# Patient Record
Sex: MALE | Race: WHITE
[De-identification: names, ages, dates, MRNs, and addresses within clinical notes are randomized per-mention and may not be internally consistent; named-entity substitution may affect disease eponyms.]

---

## 2020-07-12 ENCOUNTER — HOSPITAL ENCOUNTER (INPATIENT)
Dept: HOSPITAL 15 - ER | Age: 85
LOS: 4 days | Discharge: HOME | DRG: 291 | End: 2020-07-16
Attending: INTERNAL MEDICINE | Admitting: INTERNAL MEDICINE
Payer: COMMERCIAL

## 2020-07-12 VITALS — WEIGHT: 200.62 LBS | HEIGHT: 65 IN | BODY MASS INDEX: 33.43 KG/M2

## 2020-07-12 DIAGNOSIS — I50.33: ICD-10-CM

## 2020-07-12 DIAGNOSIS — R80.9: ICD-10-CM

## 2020-07-12 DIAGNOSIS — E11.65: ICD-10-CM

## 2020-07-12 DIAGNOSIS — E11.22: ICD-10-CM

## 2020-07-12 DIAGNOSIS — I44.0: ICD-10-CM

## 2020-07-12 DIAGNOSIS — N18.3: ICD-10-CM

## 2020-07-12 DIAGNOSIS — I25.10: ICD-10-CM

## 2020-07-12 DIAGNOSIS — N40.0: ICD-10-CM

## 2020-07-12 DIAGNOSIS — E66.9: ICD-10-CM

## 2020-07-12 DIAGNOSIS — Z88.0: ICD-10-CM

## 2020-07-12 DIAGNOSIS — Z79.84: ICD-10-CM

## 2020-07-12 DIAGNOSIS — Z90.49: ICD-10-CM

## 2020-07-12 DIAGNOSIS — G20: ICD-10-CM

## 2020-07-12 DIAGNOSIS — R00.1: ICD-10-CM

## 2020-07-12 DIAGNOSIS — I13.0: Primary | ICD-10-CM

## 2020-07-12 DIAGNOSIS — E87.1: ICD-10-CM

## 2020-07-12 DIAGNOSIS — E78.5: ICD-10-CM

## 2020-07-12 DIAGNOSIS — Z95.1: ICD-10-CM

## 2020-07-12 DIAGNOSIS — B02.9: ICD-10-CM

## 2020-07-12 DIAGNOSIS — C61: ICD-10-CM

## 2020-07-12 PROCEDURE — 80053 COMPREHEN METABOLIC PANEL: CPT

## 2020-07-12 PROCEDURE — 97530 THERAPEUTIC ACTIVITIES: CPT

## 2020-07-12 PROCEDURE — 71045 X-RAY EXAM CHEST 1 VIEW: CPT

## 2020-07-12 PROCEDURE — 93306 TTE W/DOPPLER COMPLETE: CPT

## 2020-07-12 PROCEDURE — 87040 BLOOD CULTURE FOR BACTERIA: CPT

## 2020-07-12 PROCEDURE — 36415 COLL VENOUS BLD VENIPUNCTURE: CPT

## 2020-07-12 PROCEDURE — 80061 LIPID PANEL: CPT

## 2020-07-12 PROCEDURE — 78452 HT MUSCLE IMAGE SPECT MULT: CPT

## 2020-07-12 PROCEDURE — 85610 PROTHROMBIN TIME: CPT

## 2020-07-12 PROCEDURE — 83036 HEMOGLOBIN GLYCOSYLATED A1C: CPT

## 2020-07-12 PROCEDURE — 85730 THROMBOPLASTIN TIME PARTIAL: CPT

## 2020-07-12 PROCEDURE — 84484 ASSAY OF TROPONIN QUANT: CPT

## 2020-07-12 PROCEDURE — 83880 ASSAY OF NATRIURETIC PEPTIDE: CPT

## 2020-07-12 PROCEDURE — 85025 COMPLETE CBC W/AUTO DIFF WBC: CPT

## 2020-07-12 PROCEDURE — 93005 ELECTROCARDIOGRAM TRACING: CPT

## 2020-07-12 PROCEDURE — 93017 CV STRESS TEST TRACING ONLY: CPT

## 2020-07-12 PROCEDURE — 97163 PT EVAL HIGH COMPLEX 45 MIN: CPT

## 2020-07-12 PROCEDURE — 80048 BASIC METABOLIC PNL TOTAL CA: CPT

## 2020-07-12 PROCEDURE — 81001 URINALYSIS AUTO W/SCOPE: CPT

## 2020-07-12 PROCEDURE — 83735 ASSAY OF MAGNESIUM: CPT

## 2020-07-12 PROCEDURE — 84100 ASSAY OF PHOSPHORUS: CPT

## 2020-07-12 PROCEDURE — 80307 DRUG TEST PRSMV CHEM ANLYZR: CPT

## 2020-07-12 PROCEDURE — 70450 CT HEAD/BRAIN W/O DYE: CPT

## 2020-07-12 PROCEDURE — 87086 URINE CULTURE/COLONY COUNT: CPT

## 2020-07-13 VITALS — DIASTOLIC BLOOD PRESSURE: 92 MMHG | SYSTOLIC BLOOD PRESSURE: 160 MMHG

## 2020-07-13 VITALS — SYSTOLIC BLOOD PRESSURE: 157 MMHG | DIASTOLIC BLOOD PRESSURE: 78 MMHG

## 2020-07-13 LAB
ALBUMIN SERPL-MCNC: 3.6 G/DL (ref 3.4–5)
ALCOHOL, URINE: 7 MG/DL (ref 0–10)
ALP SERPL-CCNC: 70 U/L (ref 45–117)
ALT SERPL-CCNC: 10 U/L (ref 16–61)
AMPHETAMINES UR QL SCN: NEGATIVE
ANION GAP SERPL CALCULATED.3IONS-SCNC: 4 MMOL/L (ref 5–15)
BARBITURATES UR QL SCN: NEGATIVE
BENZODIAZ UR QL SCN: NEGATIVE
BILIRUB SERPL-MCNC: 1.3 MG/DL (ref 0.2–1)
BUN SERPL-MCNC: 30 MG/DL (ref 7–18)
BUN/CREAT SERPL: 22.6
BZE UR QL SCN: NEGATIVE
CALCIUM SERPL-MCNC: 8.8 MG/DL (ref 8.5–10.1)
CANNABINOIDS UR QL SCN: NEGATIVE
CHLORIDE SERPL-SCNC: 99 MMOL/L (ref 98–107)
CHOLEST SERPL-MCNC: 152 MG/DL (ref ?–200)
CO2 SERPL-SCNC: 27 MMOL/L (ref 21–32)
GLUCOSE SERPL-MCNC: 136 MG/DL (ref 74–106)
HCT VFR BLD AUTO: 49.3 % (ref 41–53)
HDLC SERPL-MCNC: 35 MG/DL (ref 40–59)
HGB BLD-MCNC: 16.6 G/DL (ref 13.5–17.5)
MCH RBC QN AUTO: 31.3 PG (ref 28–32)
MCV RBC AUTO: 92.8 FL (ref 80–100)
NRBC BLD QL AUTO: 0 %
OPIATES UR QL SCN: NEGATIVE
PCP UR QL SCN: NEGATIVE
POTASSIUM SERPL-SCNC: 3.8 MMOL/L (ref 3.5–5.1)
PROT SERPL-MCNC: 7.2 G/DL (ref 6.4–8.2)
SODIUM SERPL-SCNC: 130 MMOL/L (ref 136–145)
TRIGL SERPL-MCNC: 143 MG/DL (ref ?–150)

## 2020-07-13 RX ADMIN — CARVEDILOL SCH MG: 3.12 TABLET, FILM COATED ORAL at 22:00

## 2020-07-13 RX ADMIN — TERAZOSIN HYDROCHLORIDE SCH MG: 1 CAPSULE ORAL at 21:57

## 2020-07-13 RX ADMIN — ATORVASTATIN CALCIUM SCH MG: 20 TABLET, FILM COATED ORAL at 21:56

## 2020-07-13 RX ADMIN — SODIUM CHLORIDE SCH ML: 9 INJECTION INTRAMUSCULAR; INTRAVENOUS; SUBCUTANEOUS at 21:56

## 2020-07-13 RX ADMIN — Medication SCH TAB: at 21:56

## 2020-07-13 RX ADMIN — Medication SCH TAB: at 18:44

## 2020-07-13 NOTE — NUR
Blood Pressure Medication Held

Patient's blood pressure 150/66, HR 66. After administration of terazosin 2mg PO per MD 
order, blood pressure reassessed to be 109/62, HR 64. Held Coreg 6.25mg PO to prevent 
further decrease in blood pressure and heart rate. Per patient, he denies taking blood 
pressure or heart rate medications at home. Will continue to monitor.

## 2020-07-13 NOTE — NUR
Telemetry admit from ER:

TONY GOMES admitted to Telemetry unit after SBAR received. Patient oriented to KERMIT PRASAD, RN primary RN, unit, room, bed, and unit policies regarding patient care and visiting 
hours. Patient now on continuous telemetry monitoring, tele box # [id number]. Patient weighed by 
bedscale and encouraged to call if they need something. All questions and concerns 
addressed, patient verbalized understanding.

## 2020-07-13 NOTE — NUR
CLOSING NOTE:

Patient resting in bed. No S/S of distress at this time.

Bed alarm activated for patient safety.

Care endorsed to NOC RN.

## 2020-07-14 VITALS — DIASTOLIC BLOOD PRESSURE: 77 MMHG | SYSTOLIC BLOOD PRESSURE: 139 MMHG

## 2020-07-14 VITALS — SYSTOLIC BLOOD PRESSURE: 128 MMHG | DIASTOLIC BLOOD PRESSURE: 73 MMHG

## 2020-07-14 VITALS — DIASTOLIC BLOOD PRESSURE: 77 MMHG | SYSTOLIC BLOOD PRESSURE: 149 MMHG

## 2020-07-14 VITALS — SYSTOLIC BLOOD PRESSURE: 146 MMHG | DIASTOLIC BLOOD PRESSURE: 80 MMHG

## 2020-07-14 VITALS — DIASTOLIC BLOOD PRESSURE: 85 MMHG | SYSTOLIC BLOOD PRESSURE: 121 MMHG

## 2020-07-14 LAB
ALBUMIN SERPL-MCNC: 3.3 G/DL (ref 3.4–5)
ALP SERPL-CCNC: 63 U/L (ref 45–117)
ALT SERPL-CCNC: 9 U/L (ref 16–61)
ANION GAP SERPL CALCULATED.3IONS-SCNC: 4 MMOL/L (ref 5–15)
APTT PPP: 30 SEC (ref 23.64–32.05)
BILIRUB SERPL-MCNC: 1.4 MG/DL (ref 0.2–1)
BUN SERPL-MCNC: 31 MG/DL (ref 7–18)
BUN/CREAT SERPL: 22
CALCIUM SERPL-MCNC: 8.5 MG/DL (ref 8.5–10.1)
CHLORIDE SERPL-SCNC: 100 MMOL/L (ref 98–107)
CO2 SERPL-SCNC: 28 MMOL/L (ref 21–32)
GLUCOSE SERPL-MCNC: 149 MG/DL (ref 74–106)
HCT VFR BLD AUTO: 46.9 % (ref 41–53)
HGB BLD-MCNC: 15.8 G/DL (ref 13.5–17.5)
INR PPP: 1.09 (ref 0.9–1.15)
MAGNESIUM SERPL-MCNC: 2.5 MG/DL (ref 1.6–2.6)
MCH RBC QN AUTO: 31.2 PG (ref 28–32)
MCV RBC AUTO: 92.5 FL (ref 80–100)
NRBC BLD QL AUTO: 0 %
POTASSIUM SERPL-SCNC: 3.6 MMOL/L (ref 3.5–5.1)
PROT SERPL-MCNC: 6.6 G/DL (ref 6.4–8.2)
SODIUM SERPL-SCNC: 132 MMOL/L (ref 136–145)

## 2020-07-14 RX ADMIN — CARVEDILOL SCH MG: 3.12 TABLET, FILM COATED ORAL at 21:34

## 2020-07-14 RX ADMIN — ENOXAPARIN SODIUM SCH MG: 40 INJECTION SUBCUTANEOUS at 10:37

## 2020-07-14 RX ADMIN — Medication SCH TAB: at 06:10

## 2020-07-14 RX ADMIN — CLOPIDOGREL BISULFATE SCH MG: 75 TABLET ORAL at 10:39

## 2020-07-14 RX ADMIN — SODIUM CHLORIDE SCH ML: 9 INJECTION INTRAMUSCULAR; INTRAVENOUS; SUBCUTANEOUS at 21:58

## 2020-07-14 RX ADMIN — CARVEDILOL SCH MG: 3.12 TABLET, FILM COATED ORAL at 10:38

## 2020-07-14 RX ADMIN — SODIUM CHLORIDE SCH ML: 9 INJECTION INTRAMUSCULAR; INTRAVENOUS; SUBCUTANEOUS at 06:10

## 2020-07-14 RX ADMIN — TERAZOSIN HYDROCHLORIDE SCH MG: 1 CAPSULE ORAL at 21:58

## 2020-07-14 RX ADMIN — SODIUM CHLORIDE SCH ML: 9 INJECTION INTRAMUSCULAR; INTRAVENOUS; SUBCUTANEOUS at 14:00

## 2020-07-14 RX ADMIN — VITAMIN D, TAB 1000IU (100/BT) SCH UNIT: 25 TAB at 10:37

## 2020-07-14 RX ADMIN — ATORVASTATIN CALCIUM SCH MG: 20 TABLET, FILM COATED ORAL at 21:33

## 2020-07-14 RX ADMIN — Medication SCH TAB: at 12:00

## 2020-07-14 RX ADMIN — FINASTERIDE SCH MG: 5 TABLET, FILM COATED ORAL at 10:39

## 2020-07-14 RX ADMIN — Medication SCH TAB: at 18:14

## 2020-07-14 RX ADMIN — Medication SCH TAB: at 21:33

## 2020-07-14 NOTE — NUR
Opening Shift Note

Assumed care of patient, awake and alert.  No S/S of distress/SOB or pain.  Instructed on 
POC and to call for assist PRN, will continue to monitor for changes Q1hr and PRN.Advised no 
food or drink after midnight for stress test tomorrow.

## 2020-07-14 NOTE — NUR
IV removal/insertion

Patient accidently removed 22g IV to the Right forearm. Catheter tip fully intact. Pressure 
dressing applied to site.

NOTE:Inserted 22g IV to the Right hand. Patient tolerated well.

## 2020-07-15 VITALS — SYSTOLIC BLOOD PRESSURE: 119 MMHG | DIASTOLIC BLOOD PRESSURE: 70 MMHG

## 2020-07-15 VITALS — DIASTOLIC BLOOD PRESSURE: 78 MMHG | SYSTOLIC BLOOD PRESSURE: 147 MMHG

## 2020-07-15 VITALS — DIASTOLIC BLOOD PRESSURE: 69 MMHG | SYSTOLIC BLOOD PRESSURE: 117 MMHG

## 2020-07-15 VITALS — DIASTOLIC BLOOD PRESSURE: 65 MMHG | SYSTOLIC BLOOD PRESSURE: 130 MMHG

## 2020-07-15 VITALS — DIASTOLIC BLOOD PRESSURE: 83 MMHG | SYSTOLIC BLOOD PRESSURE: 137 MMHG

## 2020-07-15 LAB
ANION GAP SERPL CALCULATED.3IONS-SCNC: 5 MMOL/L (ref 5–15)
BUN SERPL-MCNC: 26 MG/DL (ref 7–18)
BUN/CREAT SERPL: 21
CALCIUM SERPL-MCNC: 8.7 MG/DL (ref 8.5–10.1)
CHLORIDE SERPL-SCNC: 99 MMOL/L (ref 98–107)
CO2 SERPL-SCNC: 26 MMOL/L (ref 21–32)
GLUCOSE SERPL-MCNC: 139 MG/DL (ref 74–106)
POTASSIUM SERPL-SCNC: 3.8 MMOL/L (ref 3.5–5.1)
SODIUM SERPL-SCNC: 130 MMOL/L (ref 136–145)

## 2020-07-15 RX ADMIN — FINASTERIDE SCH MG: 5 TABLET, FILM COATED ORAL at 10:07

## 2020-07-15 RX ADMIN — Medication SCH TAB: at 05:08

## 2020-07-15 RX ADMIN — Medication SCH TAB: at 11:37

## 2020-07-15 RX ADMIN — SODIUM CHLORIDE SCH ML: 9 INJECTION INTRAMUSCULAR; INTRAVENOUS; SUBCUTANEOUS at 22:24

## 2020-07-15 RX ADMIN — Medication SCH TAB: at 22:19

## 2020-07-15 RX ADMIN — SODIUM CHLORIDE SCH ML: 9 INJECTION INTRAMUSCULAR; INTRAVENOUS; SUBCUTANEOUS at 18:24

## 2020-07-15 RX ADMIN — Medication SCH TAB: at 18:24

## 2020-07-15 RX ADMIN — FUROSEMIDE SCH MG: 10 INJECTION, SOLUTION INTRAMUSCULAR; INTRAVENOUS at 10:05

## 2020-07-15 RX ADMIN — SODIUM CHLORIDE SCH ML: 9 INJECTION INTRAMUSCULAR; INTRAVENOUS; SUBCUTANEOUS at 05:08

## 2020-07-15 RX ADMIN — TERAZOSIN HYDROCHLORIDE SCH MG: 1 CAPSULE ORAL at 22:20

## 2020-07-15 RX ADMIN — CARVEDILOL SCH MG: 3.12 TABLET, FILM COATED ORAL at 22:20

## 2020-07-15 RX ADMIN — VITAMIN D, TAB 1000IU (100/BT) SCH UNIT: 25 TAB at 10:06

## 2020-07-15 RX ADMIN — ENOXAPARIN SODIUM SCH MG: 40 INJECTION SUBCUTANEOUS at 10:05

## 2020-07-15 RX ADMIN — CLOPIDOGREL BISULFATE SCH MG: 75 TABLET ORAL at 10:06

## 2020-07-15 RX ADMIN — CARVEDILOL SCH MG: 3.12 TABLET, FILM COATED ORAL at 10:06

## 2020-07-15 RX ADMIN — ATORVASTATIN CALCIUM SCH MG: 20 TABLET, FILM COATED ORAL at 22:19

## 2020-07-15 NOTE — NUR
IV insertion

IV access obtained, via clean sterile technique by inserting 22 gauge catheter at left hand 
after  attempt. IV secured properly. No trauma to site. Patient tolerated well.

## 2020-07-15 NOTE — NUR
Opening Shift Note

Assumed care of patient, awake, alert,and oriented.  No S/S of distress/SOB or pain. Bed in 
lowest/locked position, bed rails up x2, call light within reach. Instructed on POC and to 
call for assist PRN. Will continue to monitor for changes Q1hr and PRN.

## 2020-07-15 NOTE — NUR
Opening Shift Note

Assumed care of patient, awake and alert.  No S/S of distress/SOB or pain.  Instructed on 
POC and to call for assist PRN, patient verbalized understanding, call light within reach, 
will continue to monitor for changes Q1hr and PRN.

## 2020-07-15 NOTE — NUR
Care report given to Minor JAMISON, patient is NPO for stress test today, patient is 
resting no distress.

## 2020-07-16 VITALS — DIASTOLIC BLOOD PRESSURE: 64 MMHG | SYSTOLIC BLOOD PRESSURE: 123 MMHG

## 2020-07-16 VITALS — SYSTOLIC BLOOD PRESSURE: 118 MMHG | DIASTOLIC BLOOD PRESSURE: 64 MMHG

## 2020-07-16 VITALS — SYSTOLIC BLOOD PRESSURE: 114 MMHG | DIASTOLIC BLOOD PRESSURE: 64 MMHG

## 2020-07-16 VITALS — DIASTOLIC BLOOD PRESSURE: 64 MMHG | SYSTOLIC BLOOD PRESSURE: 118 MMHG

## 2020-07-16 VITALS — DIASTOLIC BLOOD PRESSURE: 79 MMHG | SYSTOLIC BLOOD PRESSURE: 145 MMHG

## 2020-07-16 RX ADMIN — CLOPIDOGREL BISULFATE SCH MG: 75 TABLET ORAL at 09:59

## 2020-07-16 RX ADMIN — FINASTERIDE SCH MG: 5 TABLET, FILM COATED ORAL at 09:48

## 2020-07-16 RX ADMIN — SODIUM CHLORIDE SCH ML: 9 INJECTION INTRAMUSCULAR; INTRAVENOUS; SUBCUTANEOUS at 14:00

## 2020-07-16 RX ADMIN — Medication SCH TAB: at 12:07

## 2020-07-16 RX ADMIN — SODIUM CHLORIDE SCH ML: 9 INJECTION INTRAMUSCULAR; INTRAVENOUS; SUBCUTANEOUS at 05:50

## 2020-07-16 RX ADMIN — ENOXAPARIN SODIUM SCH MG: 40 INJECTION SUBCUTANEOUS at 09:59

## 2020-07-16 RX ADMIN — VITAMIN D, TAB 1000IU (100/BT) SCH UNIT: 25 TAB at 09:49

## 2020-07-16 RX ADMIN — CARVEDILOL SCH MG: 3.12 TABLET, FILM COATED ORAL at 09:58

## 2020-07-16 RX ADMIN — Medication SCH TAB: at 05:49

## 2020-07-16 RX ADMIN — FUROSEMIDE SCH MG: 10 INJECTION, SOLUTION INTRAMUSCULAR; INTRAVENOUS at 09:48

## 2020-07-16 NOTE — NUR
Updated hospitalist Hernandes on patient's status, no new order at this time, will continue to 
monitor

## 2020-07-16 NOTE — NUR
Patient Discharged

   The patient received the order to be discharged. prepared him for discharge. Gathered and 
finalized paperwork, reviewed it with the patient who then signed the discharge paperwork. 
Escorted the patient out via wheel chair at 1735hrs.

## 2020-07-16 NOTE — NUR
Medication Held

   Held the patient's Coreg this AM due to his periods of bradycardia. The patient's HR 
dropped to the 30s when he was sleeping. He dropped off and on to the 40-50s for his HR this 
shift. Dr. Nevarez aware of the situation. Said she would D/C the Coreg.

## 2020-07-16 NOTE — NUR
Pt requests PT treatment later, stating he does not want to leave the room because he is 
waiting on his wife to call. Will attempt later.

## 2020-07-16 NOTE — NUR
Patient had a heart rate in the 30's to 40's. Checked patient, no complains of chest pain or 
SOB. V/S taken /58, O2 Sat 94% room air, will continue to monitor